# Patient Record
Sex: MALE | Race: OTHER
[De-identification: names, ages, dates, MRNs, and addresses within clinical notes are randomized per-mention and may not be internally consistent; named-entity substitution may affect disease eponyms.]

---

## 2021-05-14 ENCOUNTER — HOSPITAL ENCOUNTER (EMERGENCY)
Dept: HOSPITAL 54 - ER | Age: 41
Discharge: HOME | End: 2021-05-14
Payer: SELF-PAY

## 2021-05-14 VITALS
HEIGHT: 68 IN | DIASTOLIC BLOOD PRESSURE: 70 MMHG | SYSTOLIC BLOOD PRESSURE: 127 MMHG | WEIGHT: 160 LBS | BODY MASS INDEX: 24.25 KG/M2
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
## 2021-05-14 DIAGNOSIS — S40.012A: Primary | ICD-10-CM
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
## 2021-05-14 NOTE — NUR
PT OK TO DISCHARGE PER NADYA NP. Patient discharged to home in stable 
condition. Written and verbal after care instructions given. Patient verbalizes 
understanding of instruction.Patient is awake and alert to self, day, and 
place. PT ambulatory with a steady gait